# Patient Record
Sex: FEMALE | Race: AMERICAN INDIAN OR ALASKA NATIVE
[De-identification: names, ages, dates, MRNs, and addresses within clinical notes are randomized per-mention and may not be internally consistent; named-entity substitution may affect disease eponyms.]

---
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
## 2020-10-07 ENCOUNTER — OFFICE VISIT (OUTPATIENT)
Dept: URBAN - METROPOLITAN AREA CLINIC 118 | Facility: CLINIC | Age: 20
End: 2020-10-07

## 2020-11-04 ENCOUNTER — OFFICE VISIT (OUTPATIENT)
Dept: URBAN - METROPOLITAN AREA TELEHEALTH 2 | Facility: TELEHEALTH | Age: 20
End: 2020-11-04

## 2021-04-30 ENCOUNTER — OFFICE VISIT (OUTPATIENT)
Dept: URBAN - METROPOLITAN AREA TELEHEALTH 2 | Facility: TELEHEALTH | Age: 21
End: 2021-04-30
Payer: COMMERCIAL

## 2021-04-30 VITALS — BODY MASS INDEX: 19.83 KG/M2 | WEIGHT: 105 LBS | HEIGHT: 61 IN

## 2021-04-30 DIAGNOSIS — R11.10 ACUTE VOMITING: ICD-10-CM

## 2021-04-30 DIAGNOSIS — R11.0 NAUSEA: ICD-10-CM

## 2021-04-30 DIAGNOSIS — R10.13 EPIGASTRIC ABDOMINAL PAIN: ICD-10-CM

## 2021-04-30 DIAGNOSIS — R63.4 WEIGHT LOSS: ICD-10-CM

## 2021-04-30 PROCEDURE — 99204 OFFICE O/P NEW MOD 45 MIN: CPT | Performed by: INTERNAL MEDICINE

## 2021-04-30 RX ORDER — PANTOPRAZOLE SODIUM 40 MG/1
1 TABLET TABLET, DELAYED RELEASE ORAL ONCE A DAY
Qty: 90 | Refills: 3 | OUTPATIENT
Start: 2021-04-30

## 2021-04-30 NOTE — HPI-TODAY'S VISIT:
abdominal pain x 1 year, worse with eating, has been losing weight due to the symptoms post pranding pain that is usually post-prandial, worse with eating and better with nothing, most times when she eats is when she gets it, moderate pain, cramping, non radiating associated with nausea and weight loss and sometimes vomits, can sometimes radiate through to her back has not had eval to date for this

## 2021-05-06 ENCOUNTER — OFFICE VISIT (OUTPATIENT)
Dept: URBAN - METROPOLITAN AREA SURGERY CENTER 23 | Facility: SURGERY CENTER | Age: 21
End: 2021-05-06

## 2021-10-21 ENCOUNTER — HOSPITAL ENCOUNTER (EMERGENCY)
Dept: HOSPITAL 5 - ED | Age: 21
Discharge: HOME | End: 2021-10-21
Payer: MEDICAID

## 2021-10-21 VITALS — DIASTOLIC BLOOD PRESSURE: 63 MMHG | SYSTOLIC BLOOD PRESSURE: 118 MMHG

## 2021-10-21 DIAGNOSIS — Z91.010: ICD-10-CM

## 2021-10-21 DIAGNOSIS — R55: ICD-10-CM

## 2021-10-21 DIAGNOSIS — Z88.6: ICD-10-CM

## 2021-10-21 DIAGNOSIS — R10.2: ICD-10-CM

## 2021-10-21 DIAGNOSIS — G43.909: ICD-10-CM

## 2021-10-21 DIAGNOSIS — N83.299: Primary | ICD-10-CM

## 2021-10-21 DIAGNOSIS — R10.30: ICD-10-CM

## 2021-10-21 DIAGNOSIS — G40.909: ICD-10-CM

## 2021-10-21 DIAGNOSIS — Z88.1: ICD-10-CM

## 2021-10-21 LAB
BASOPHILS # (AUTO): 0.1 K/MM3 (ref 0–0.1)
BASOPHILS NFR BLD AUTO: 0.7 % (ref 0–1.8)
BILIRUB UR QL STRIP: (no result)
BLOOD UR QL VISUAL: (no result)
BUN SERPL-MCNC: 11 MG/DL (ref 7–17)
BUN/CREAT SERPL: 22 %
CALCIUM SERPL-MCNC: 9.3 MG/DL (ref 8.4–10.2)
EOSINOPHIL # BLD AUTO: 0 K/MM3 (ref 0–0.4)
EOSINOPHIL NFR BLD AUTO: 0.3 % (ref 0–4.3)
HCT VFR BLD CALC: 27.2 % (ref 30.3–42.9)
HEMOLYSIS INDEX: 1
HGB BLD-MCNC: 8.3 GM/DL (ref 10.1–14.3)
LYMPHOCYTES # BLD AUTO: 1 K/MM3 (ref 1.2–5.4)
LYMPHOCYTES NFR BLD AUTO: 9.9 % (ref 13.4–35)
MCHC RBC AUTO-ENTMCNC: 31 % (ref 30–34)
MCV RBC AUTO: 64 FL (ref 79–97)
MONOCYTES # (AUTO): 0.5 K/MM3 (ref 0–0.8)
MONOCYTES % (AUTO): 5.1 % (ref 0–7.3)
MUCOUS THREADS #/AREA URNS HPF: (no result) /HPF
PH UR STRIP: 5 [PH] (ref 5–7)
PLATELET # BLD: 625 K/MM3 (ref 140–440)
RBC # BLD AUTO: 4.25 M/MM3 (ref 3.65–5.03)
RBC #/AREA URNS HPF: 6 /HPF (ref 0–6)
UROBILINOGEN UR-MCNC: < 2 MG/DL (ref ?–2)
WBC #/AREA URNS HPF: 1 /HPF (ref 0–6)

## 2021-10-21 PROCEDURE — 99284 EMERGENCY DEPT VISIT MOD MDM: CPT

## 2021-10-21 PROCEDURE — 85025 COMPLETE CBC W/AUTO DIFF WBC: CPT

## 2021-10-21 PROCEDURE — 81001 URINALYSIS AUTO W/SCOPE: CPT

## 2021-10-21 PROCEDURE — 84703 CHORIONIC GONADOTROPIN ASSAY: CPT

## 2021-10-21 PROCEDURE — 96372 THER/PROPH/DIAG INJ SC/IM: CPT

## 2021-10-21 PROCEDURE — 76856 US EXAM PELVIC COMPLETE: CPT

## 2021-10-21 PROCEDURE — 80048 BASIC METABOLIC PNL TOTAL CA: CPT

## 2021-10-21 PROCEDURE — 36415 COLL VENOUS BLD VENIPUNCTURE: CPT

## 2021-10-21 NOTE — ULTRASOUND REPORT
ULTRASOUND PELVIS  COMPLETE



INDICATION / CLINICAL INFORMATION:   Right groin pain.



TECHNIQUE:  Transabdominal.

Duplex Color Doppler used: Yes. 



COMPARISON:  None available



FINDINGS:

UTERUS: Present.  

- Appearance (if present): No significant abnormality.

- Size in cm (if present): 6.3 x 4.0 x 6.9. 

- Endometrial Complex (if present): No significant abnormality.. Thickness in cm (if measured) = 0.7

- Mass lesions: None.

- Additional findings: None.



RIGHT ADNEXA: There are multiple cystic structures in the right adnexa measuring up to 2 cm. The righ
t ovary is not clearly defined. It is unclear if this represents multiple ovarian cysts or right hydr
osalpinx. Consider correlation with transvaginal exam. Normal color Doppler blood flow.



LEFT ADNEXA: No significant ovarian cyst or mass. Normal color Doppler blood flow.



URINARY BLADDER: No significant abnormality. 

FREE FLUID: None.

ADDITIONAL FINDINGS: None.



IMPRESSION:

 Unremarkable uterus and left ovary.

Abnormal appearance of the right adnexa as described above. This is unclear if this represents ovaria
n cystic disease or right hydrosalpinx. See above. No evidence for torsion.

 



Signer Name: Corby Joshi Jr, MD 

Signed: 10/21/2021 4:39 PM

Workstation Name: [id number]

## 2021-10-21 NOTE — EMERGENCY DEPARTMENT REPORT
ED Female  HPI





- General


Chief complaint: Abdominal Pain


Stated complaint: GYN SENT POSS RUPTURED CYST


Time Seen by Provider: 10/21/21 13:59


Source: patient


Mode of arrival: Wheelchair


Limitations: No Limitations





- History of Present Illness


Initial comments: 





Chief complaint: Groin pain pelvic pain





HPI: 21-year-old female history of ovarian cyst, seizure, recurrent syncope, 

migraine headache who presents with right groin pain pelvic pain for the last 

several days.  She is currently at the end of her menstrual cycle.  She is not 

sexually active.  She had follow-up appointment with her gynecologist Dr. Fuentes.  Due to severe pain patient was referred to ER for evaluation.  

Patient was diagnosed with ruptured ovarian cyst several months earlier.  Today 

was a routine appointment for follow-up with Dr. Fuentes gynecologist.  Pain 

is now 19.  She denies vaginal discharge.  She currently has light menstrual 

flow.





Patient was evaluated at [facility] and gynecologist for

recurrent pelvic pain.  Unable to diagnose rupture cyst by imaging.  However Dr. Fuentes suspects that patient did have ruptured ovarian cyst.





According to mother's report, previous imaging did confirm cystic ovary.  Dr. Fuentes prescribed birth control pill.  Today's appointment was follow-up 

after initial treatment plan.


MD Complaint: pelvic pain


-: Gradual, days(s) (Last several days)


Severity: severe


Severity scale (0 -10): 9


Quality: sharp


Consistency: constant


Improves with: none


Worsens with: none


Are you Pregnant Now?: No


Associated Symptoms: other (End of menstrual cycle)





- Related Data


Sexually active: No


                                  Previous Rx's











 Medication  Instructions  Recorded  Last Taken  Type


 


Albuterol Sulfate [Ventolin HFA] 2 puff IH Q4H PRN #1 hfa.aer.ad 07/13/16 

Unknown Rx


 


Prednisone [predniSONE 5 mg (6-Day 5 mg PO .TAPER #1 tab.ds.pk 07/13/16 Unknown 

Rx





Pack, 21 Tabs)]    


 


traMADoL [Ultram] 50 mg PO Q6HR PRN #20 tablet 07/13/16 Unknown Rx


 


traMADoL [Ultram 50 MG tab] 50 mg PO Q6HR PRN #20 tablet 02/26/18 Unknown Rx


 


Acetaminophen/Codeine [Tylenol 1 tab PO Q6H PRN #30 tab 10/21/21 Unknown Rx





/Codeine # 3 tab]    











                                    Allergies











Allergy/AdvReac Type Severity Reaction Status Date / Time


 


cefdinir [From Omnicef] Allergy  Swelling Verified 02/25/18 23:56


 


cyclobenzaprine Allergy  Swelling Verified 02/25/18 23:57





[From Flexeril]     


 


acetaminophen [From Tylenol] AdvReac  Unknown Verified 07/13/16 02:28


 


ibuprofen [From Motrin] AdvReac  Unknown Verified 07/13/16 02:28


 


peanut AdvReac  Unknown Verified 07/13/16 02:27














ED Review of Systems


ROS: 


Stated complaint: GYN SENT POSS RUPTURED CYST


Other details as noted in HPI





Comment: All other systems reviewed and negative


Constitutional: denies: chills, fever, malaise


Respiratory: denies: cough, shortness of breath


Gastrointestinal: other (Right groin pelvic pain).  denies: abdominal pain, 

nausea, vomiting, diarrhea


Genitourinary: denies: urgency, dysuria, frequency, hematuria





ED Past Medical Hx





- Past Medical History


Previous Medical History?: Yes


Hx Hypertension: No


Hx Headaches / Migraines: Yes


Hx Seizures: Yes


Additional medical history: Recurrent syncope, ovarian cyst





- Social History


Smoking Status: Never Smoker


Substance Use Type: None





- Medications


Home Medications: 


                                Home Medications











 Medication  Instructions  Recorded  Confirmed  Last Taken  Type


 


Albuterol Sulfate [Ventolin HFA] 2 puff IH Q4H PRN #1 hfa.aer.ad 07/13/16  

Unknown Rx


 


Prednisone [predniSONE 5 mg (6-Day 5 mg PO .TAPER #1 tab.ds.pk 07/13/16  Unknown

 Rx





Pack, 21 Tabs)]     


 


traMADoL [Ultram] 50 mg PO Q6HR PRN #20 tablet 07/13/16  Unknown Rx


 


traMADoL [Ultram 50 MG tab] 50 mg PO Q6HR PRN #20 tablet 02/26/18  Unknown Rx


 


Acetaminophen/Codeine [Tylenol 1 tab PO Q6H PRN #30 tab 10/21/21  Unknown Rx





/Codeine # 3 tab]     














ED Physical Exam





- General


Limitations: No Limitations


General appearance: alert, in no apparent distress, other (In severe pain)





- Head


Head exam: Present: atraumatic, normocephalic





- Eye


Eye exam: Present: normal appearance





- ENT


ENT exam: Present: mucous membranes moist





- Neck


Neck exam: Present: normal inspection





- Respiratory


Respiratory exam: Present: normal lung sounds bilaterally.  Absent: respiratory 

distress





- Cardiovascular


Cardiovascular Exam: Present: regular rate, normal rhythm, normal heart sounds. 

Absent: systolic murmur, diastolic murmur, rubs, gallop





- GI/Abdominal


GI/Abdominal exam: Present: soft, normal bowel sounds.  Absent: distended, 

tenderness, guarding, rebound





- Extremities Exam


Extremities exam: Present: normal inspection





- Neurological Exam


Neurological exam: Present: alert, oriented X3





- Psychiatric


Psychiatric exam: Present: normal affect, normal mood





- Skin


Skin exam: Present: warm, dry, intact, normal color.  Absent: rash





ED Course


                                   Vital Signs











  10/21/21 10/21/21





  14:02 16:41


 


Temperature 99.6 F 


 


Pulse Rate 69 


 


Respiratory 16 





Rate  


 


Blood Pressure 118/63 





[Right]  


 


O2 Sat by Pulse 100 99





Oximetry  














- Reevaluation(s)


Reevaluation #1: 





10/21/21 14:32


Patient has not been sexually active.  She is uncomfortable with transvaginal 

probe.  Ultrasonographer recommended oral intake considering patient has empty 

bladder.





ED Medical Decision Making





- Lab Data


Result diagrams: 


                                 10/21/21 14:19





                                 10/21/21 14:19





- Medical Decision Making





Recurrent pelvic pain over the last several months refer to emergency department

 after routine appointment with gynecologist Dr. Hayward.  Patient diagnosis in

cludes ovarian cyst rupture, endometriosis, dysmenorrhea.  Patient has never 

been sexually active pregnancy PID not likely.  No abdominal tenderness to 

indicate appendicitis.





After ultrasound is obtained my colleague will make the appropriate disposition.








Critical care attestation.: 


If time is entered above; I have spent that time in minutes in the direct care 

of this critically ill patient, excluding procedure time.








ED Disposition


Clinical Impression: 


 Ovarian cyst, Abdominal pain





Disposition: 01 HOME / SELF CARE / HOMELESS


Is pt being admited?: No


Does the pt Need Aspirin: No


Condition: Stable


Instructions:  Ovarian Cyst, Abdominal Pain (ED)


Additional Instructions: 


return if worse


Prescriptions: 


Acetaminophen/Codeine [Tylenol /Codeine # 3 tab] 1 tab PO Q6H PRN #30 tab


 PRN Reason: pain


Referrals: 


ZURDO FUENTES MD [Staff Physician] - 3-5 Days


PRIMARY CARE,MD [Primary Care Provider] - 3-5 Days

## 2022-03-24 ENCOUNTER — OFFICE VISIT (OUTPATIENT)
Dept: URBAN - METROPOLITAN AREA CLINIC 70 | Facility: CLINIC | Age: 22
End: 2022-03-24

## 2022-03-24 RX ORDER — PANTOPRAZOLE SODIUM 40 MG/1
1 TABLET TABLET, DELAYED RELEASE ORAL ONCE A DAY
Qty: 90 | Refills: 3 | Status: ACTIVE | COMMUNITY
Start: 2021-04-30

## 2022-04-01 ENCOUNTER — OFFICE VISIT (OUTPATIENT)
Dept: URBAN - METROPOLITAN AREA CLINIC 52 | Facility: CLINIC | Age: 22
End: 2022-04-01

## 2022-04-07 ENCOUNTER — DASHBOARD ENCOUNTERS (OUTPATIENT)
Age: 22
End: 2022-04-07

## 2022-04-07 ENCOUNTER — WEB ENCOUNTER (OUTPATIENT)
Dept: URBAN - METROPOLITAN AREA CLINIC 52 | Facility: CLINIC | Age: 22
End: 2022-04-07

## 2022-04-07 ENCOUNTER — LAB OUTSIDE AN ENCOUNTER (OUTPATIENT)
Dept: URBAN - METROPOLITAN AREA CLINIC 52 | Facility: CLINIC | Age: 22
End: 2022-04-07

## 2022-04-07 ENCOUNTER — OFFICE VISIT (OUTPATIENT)
Dept: URBAN - METROPOLITAN AREA CLINIC 52 | Facility: CLINIC | Age: 22
End: 2022-04-07
Payer: COMMERCIAL

## 2022-04-07 VITALS
HEIGHT: 61 IN | TEMPERATURE: 98 F | HEART RATE: 91 BPM | SYSTOLIC BLOOD PRESSURE: 120 MMHG | BODY MASS INDEX: 20.58 KG/M2 | WEIGHT: 109 LBS | OXYGEN SATURATION: 100 % | DIASTOLIC BLOOD PRESSURE: 65 MMHG

## 2022-04-07 DIAGNOSIS — R10.84 GENERALIZED ABDOMINAL PAIN: ICD-10-CM

## 2022-04-07 PROCEDURE — 99204 OFFICE O/P NEW MOD 45 MIN: CPT | Performed by: INTERNAL MEDICINE

## 2022-04-07 RX ORDER — DICYCLOMINE HYDROCHLORIDE 20 MG/1
1 TABLET TABLET ORAL THREE TIMES A DAY
Qty: 90 TABLET | Refills: 3 | OUTPATIENT
Start: 2022-04-07 | End: 2022-08-05

## 2022-04-07 RX ORDER — PANTOPRAZOLE SODIUM 40 MG/1
1 TABLET TABLET, DELAYED RELEASE ORAL ONCE A DAY
Qty: 90 | Refills: 3 | Status: ON HOLD | COMMUNITY
Start: 2021-04-30

## 2022-04-07 NOTE — HPI-TODAY'S VISIT:
21 yr female presents for evaluation of abdominal pain. Pt states that she underwent laparoscopy for suspected appendicitis at [facility] in 11/2021. Surgical records not available, I requested today. As per pt and her mother, she had collections of infection and adhesions. Since then, pt having abdominal pain. Pain described as a duffuse pain throughout abdomen. States that pain is constant, not realted eating or BMs. Pt has chronic constipation. Denies diarrhea or rectal bleeding. Denies nausea or vomiting. Pt recently evaluated at [facility] ER 3/18/22 with abdominal pain. CT A/P was reportedly normal. Pt states that she saw her surgeon today who recommended GI evaluation.

## 2022-04-28 ENCOUNTER — TELEPHONE ENCOUNTER (OUTPATIENT)
Dept: URBAN - METROPOLITAN AREA CLINIC 94 | Facility: CLINIC | Age: 22
End: 2022-04-28

## 2022-04-30 ENCOUNTER — ERX REFILL RESPONSE (OUTPATIENT)
Dept: URBAN - METROPOLITAN AREA CLINIC 52 | Facility: CLINIC | Age: 22
End: 2022-04-30

## 2022-04-30 RX ORDER — DICYCLOMINE HYDROCHLORIDE 20 MG/1
TAKE 1 TABLET BY MOUTH THREE TIMES A DAY FOR 30 DAYS TABLET ORAL
Qty: 270 TABLET | Refills: 2 | OUTPATIENT

## 2022-04-30 RX ORDER — DICYCLOMINE HYDROCHLORIDE 20 MG/1
1 TABLET TABLET ORAL THREE TIMES A DAY
Qty: 90 TABLET | Refills: 3 | OUTPATIENT

## 2022-05-04 ENCOUNTER — LAB OUTSIDE AN ENCOUNTER (OUTPATIENT)
Dept: URBAN - METROPOLITAN AREA CLINIC 94 | Facility: CLINIC | Age: 22
End: 2022-05-04

## 2022-06-09 ENCOUNTER — OFFICE VISIT (OUTPATIENT)
Dept: URBAN - METROPOLITAN AREA CLINIC 52 | Facility: CLINIC | Age: 22
End: 2022-06-09

## 2022-06-16 ENCOUNTER — OFFICE VISIT (OUTPATIENT)
Dept: URBAN - METROPOLITAN AREA CLINIC 52 | Facility: CLINIC | Age: 22
End: 2022-06-16